# Patient Record
Sex: FEMALE | URBAN - NONMETROPOLITAN AREA
[De-identification: names, ages, dates, MRNs, and addresses within clinical notes are randomized per-mention and may not be internally consistent; named-entity substitution may affect disease eponyms.]

---

## 2021-08-17 ENCOUNTER — APPOINTMENT (RX ONLY)
Dept: URBAN - NONMETROPOLITAN AREA CLINIC 13 | Facility: CLINIC | Age: 26
Setting detail: DERMATOLOGY
End: 2021-08-17

## 2021-08-17 DIAGNOSIS — Z41.9 ENCOUNTER FOR PROCEDURE FOR PURPOSES OTHER THAN REMEDYING HEALTH STATE, UNSPECIFIED: ICD-10-CM

## 2021-08-17 PROCEDURE — ? DYSPORT

## 2021-08-17 NOTE — PROCEDURE: DYSPORT
[facility] Units: 0
Additional Area 4 Location: high center forehead
Show Topical Anesthesia: Yes
Show Right And Left Pupillary Line Units: No
Expiration Date (Month Year): 3/22
Additional Area 1 Location: periorbital
Lot #: [id number]
Additional Area 3 Location: lip,
Consent: Written consent obtained. Risks include but not limited to lid/brow ptosis, bruising, swelling, diplopia, temporary effect, incomplete chemical denervation.
Detail Level: Detailed
Glabellar Complex Units: 50
Post-Care Instructions: Patient instructed to not lie down for 4 hours and limit physical activity for 24 hours. Patient instructed not to travel by airplane for 48 hours.
Price (Use Numbers Only, No Special Characters Or $): 320
Inferior Lateral Orbicularis Oculi Units: 20
Dilution (U/0.1 Cc): 1.5
Additional Area 6 Location: chin
Additional Area 2 Location: Left lateral aeye
Forehead Units: 10
Additional Area 5 Location: brow

## 2021-08-24 ENCOUNTER — APPOINTMENT (RX ONLY)
Dept: URBAN - NONMETROPOLITAN AREA CLINIC 13 | Facility: CLINIC | Age: 26
Setting detail: DERMATOLOGY
End: 2021-08-24

## 2021-08-24 DIAGNOSIS — Z41.9 ENCOUNTER FOR PROCEDURE FOR PURPOSES OTHER THAN REMEDYING HEALTH STATE, UNSPECIFIED: ICD-10-CM

## 2021-08-24 PROCEDURE — ? ADDITIONAL NOTES

## 2021-08-24 NOTE — PROCEDURE: ADDITIONAL NOTES
Detail Level: Simple
Render Risk Assessment In Note?: no
Additional Notes: 3 units of PS Botox added to forehead. Above R brow and high medial forehead.

## 2022-06-17 ENCOUNTER — APPOINTMENT (RX ONLY)
Dept: URBAN - NONMETROPOLITAN AREA CLINIC 13 | Facility: CLINIC | Age: 27
Setting detail: DERMATOLOGY
End: 2022-06-17

## 2022-06-17 DIAGNOSIS — Z41.9 ENCOUNTER FOR PROCEDURE FOR PURPOSES OTHER THAN REMEDYING HEALTH STATE, UNSPECIFIED: ICD-10-CM

## 2022-06-17 PROCEDURE — ? DYSPORT

## 2022-06-17 NOTE — PROCEDURE: DYSPORT
[facility] Units: 0
Additional Area 1 Location: periorbital
Detail Level: Detailed
Show Mentalis Units: No
Show Additional Area 2: Yes
Additional Area 6 Location: under lower lash line
Expiration Date (Month Year): 1/31/23
Additional Area 3 Location: lip
Forehead Units: 15
Consent: Written consent obtained. Risks include but not limited to lid/brow ptosis, bruising, swelling, diplopia, temporary effect, incomplete chemical denervation.
Glabellar Complex Units: 50
Post-Care Instructions: Patient instructed to not lie down for 4 hours and limit physical activity for 24 hours. Patient instructed not to travel by airplane for 48 hours.
Lot #: 
Inferior Lateral Orbicularis Oculi Units: 20
Additional Area 5 Location: brow
Additional Area 4 Location: chin
Dilution (U/0.1 Cc): 1.5
Additional Area 2 Location: Left lateral aeye
Price (Use Numbers Only, No Special Characters Or $): 712

## 2022-08-10 ENCOUNTER — APPOINTMENT (RX ONLY)
Dept: URBAN - NONMETROPOLITAN AREA CLINIC 13 | Facility: CLINIC | Age: 27
Setting detail: DERMATOLOGY
End: 2022-08-10

## 2022-08-10 DIAGNOSIS — Z41.9 ENCOUNTER FOR PROCEDURE FOR PURPOSES OTHER THAN REMEDYING HEALTH STATE, UNSPECIFIED: ICD-10-CM

## 2022-08-10 PROCEDURE — ? ADDITIONAL NOTES

## 2022-08-10 PROCEDURE — ? DYSPORT

## 2022-08-10 NOTE — PROCEDURE: DYSPORT
Additional Area 4 Units: 0
Detail Level: Detailed
Dilution (U/0.1 Cc): 1.5
Additional Area 5 Location: brow
Show Additional Area 6: Yes
Show Mentalis Units: No
Post-Care Instructions: Patient instructed to not lie down for 4 hours and limit physical activity for 24 hours. Patient instructed not to travel by airplane for 48 hours.
Additional Area 2 Location: R lateral forehead
Periorbital Skin Units: 30
Expiration Date (Month Year): 1/31/23
Additional Area 3 Location: lip
Additional Area 6 Location: under lower lash line
Lot #: [id number]
Forehead Units: 10
Consent: Written consent obtained. Risks include but not limited to lid/brow ptosis, bruising, swelling, diplopia, temporary effect, incomplete chemical denervation.
Additional Area 4 Location: chin
Additional Area 1 Location: periorbital

## 2022-08-10 NOTE — PROCEDURE: ADDITIONAL NOTES
Additional Notes: Christen had Dysport on June 17th. She states that it looked great for 3-4 weeks and then it started wearing off. Proffesional sample touch up done today.
Render Risk Assessment In Note?: no
Detail Level: Simple

## 2023-05-24 ENCOUNTER — APPOINTMENT (RX ONLY)
Dept: URBAN - NONMETROPOLITAN AREA CLINIC 13 | Facility: CLINIC | Age: 28
Setting detail: DERMATOLOGY
End: 2023-05-24

## 2023-05-24 DIAGNOSIS — Z41.9 ENCOUNTER FOR PROCEDURE FOR PURPOSES OTHER THAN REMEDYING HEALTH STATE, UNSPECIFIED: ICD-10-CM

## 2023-05-24 PROCEDURE — ? BOTOX

## 2023-05-24 NOTE — PROCEDURE: BOTOX
Glabellar Complex Units: 50
Additional Area 2 Units: 0
Consent: Written consent obtained. Risks include but not limited to lid/brow ptosis, bruising, swelling, diplopia, temporary effect, incomplete chemical denervation.
Forehead Units: 10
Show Lateral Platysmal Band Units: Yes
Lot #: [id number]
Show Lcl Units: No
Post-Care Instructions: Patient instructed to not lie down for 4 hours and limit physical activity for 24 hours. Patient instructed not to travel by airplane for 48 hours.
Inferior Lateral Orbicularis Oculi Units: 20
Dilution (U/0.1 Cc): 1
Additional Area 1 Location: brow
Additional Area 6 Location: brow R side
Depressor Anguli Oris Units: 6
Additional Area 3 Location: chin
Additional Area 5 Location: [facility]
Expiration Date (Month Year): 11/25
Incrementing Botox Units: By 0.5 Units
Additional Area 4 Location: under lower lash lines
Detail Level: Zone
Price (Use Numbers Only, No Special Characters Or $): 430.0
Additional Area 2 Location: lip upper